# Patient Record
Sex: MALE | Race: WHITE | Employment: OTHER | RURAL
[De-identification: names, ages, dates, MRNs, and addresses within clinical notes are randomized per-mention and may not be internally consistent; named-entity substitution may affect disease eponyms.]

---

## 2018-07-29 PROBLEM — K35.33 APPENDICITIS, ACUTE, WITH PERITONITIS: Status: ACTIVE | Noted: 2018-07-29

## 2018-08-09 ENCOUNTER — OFFICE VISIT (OUTPATIENT)
Dept: SURGERY | Age: 23
End: 2018-08-09

## 2018-08-09 VITALS
BODY MASS INDEX: 22.65 KG/M2 | WEIGHT: 144.6 LBS | SYSTOLIC BLOOD PRESSURE: 123 MMHG | DIASTOLIC BLOOD PRESSURE: 67 MMHG | HEART RATE: 60 BPM

## 2018-08-09 DIAGNOSIS — Z90.49 S/P LAPAROSCOPIC APPENDECTOMY: Primary | ICD-10-CM

## 2018-08-09 NOTE — PROGRESS NOTES
1. Have you been to the ER, urgent care clinic since your last visit? Hospitalized since your last visit? No    2. Have you seen or consulted any other health care providers outside of the [address] [place] [address] since your last visit? Include any pap smears or colon screening.  No

## 2018-08-09 NOTE — PROGRESS NOTES
Patient returns for follow up of lap appx on 7/29/18    No c/o  Soreness better  Appetite good  Bowels okay      EXAM:  Staples removed and wounds steristripped with benzoin    Wounds look good  No signs of infection    RTO prn  RTW 8/27/18 unrestricted